# Patient Record
Sex: FEMALE | Race: WHITE | NOT HISPANIC OR LATINO | URBAN - METROPOLITAN AREA
[De-identification: names, ages, dates, MRNs, and addresses within clinical notes are randomized per-mention and may not be internally consistent; named-entity substitution may affect disease eponyms.]

---

## 2017-07-19 ENCOUNTER — IMPORTED ENCOUNTER (OUTPATIENT)
Dept: URBAN - METROPOLITAN AREA CLINIC 50 | Facility: CLINIC | Age: 77
End: 2017-07-19

## 2017-07-24 ENCOUNTER — IMPORTED ENCOUNTER (OUTPATIENT)
Dept: URBAN - METROPOLITAN AREA CLINIC 50 | Facility: CLINIC | Age: 77
End: 2017-07-24

## 2018-07-30 ENCOUNTER — IMPORTED ENCOUNTER (OUTPATIENT)
Dept: URBAN - METROPOLITAN AREA CLINIC 50 | Facility: CLINIC | Age: 78
End: 2018-07-30

## 2019-09-27 ENCOUNTER — IMPORTED ENCOUNTER (OUTPATIENT)
Dept: URBAN - METROPOLITAN AREA CLINIC 50 | Facility: CLINIC | Age: 79
End: 2019-09-27

## 2020-09-08 ENCOUNTER — IMPORTED ENCOUNTER (OUTPATIENT)
Dept: URBAN - METROPOLITAN AREA CLINIC 50 | Facility: CLINIC | Age: 80
End: 2020-09-08

## 2020-09-15 ENCOUNTER — IMPORTED ENCOUNTER (OUTPATIENT)
Dept: URBAN - METROPOLITAN AREA CLINIC 50 | Facility: CLINIC | Age: 80
End: 2020-09-15

## 2020-09-15 PROBLEM — INACTIVE: Noted: 2020-09-15

## 2020-09-15 NOTE — PATIENT DISCUSSION
"""Resolved. "" ""Stop Tobradex Drops left eye four times a day ."" ""Continue Systane Balance both eyes twice a day . """

## 2020-11-06 ENCOUNTER — IMPORTED ENCOUNTER (OUTPATIENT)
Dept: URBAN - METROPOLITAN AREA CLINIC 50 | Facility: CLINIC | Age: 80
End: 2020-11-06

## 2021-04-18 ASSESSMENT — VISUAL ACUITY
OS_SC: 20/25
OD_SC: 20/40-1
OS_SC: 20/30
OD_SC: 20/30
OD_SC: 20/30
OS_CC: J1+@ 16 IN
OD_SC: 20/40
OD_CC: J1+@ 16 IN
OD_PH: 20/25
OD_PH: @ 16 IN
OD_CC: J1+
OS_SC: 20/25
OS_CC: J1+@ 13 IN
OS_PH: @ 16 IN
OD_PH: 20/25
OD_SC: 20/40-
OD_SC: 20/40
OS_SC: 20/40-
OS_PH: 20/25+2
OS_PH: @ 16 IN
OS_SC: 20/30
OD_PH: 20/25
OS_CC: J1+
OS_SC: 20/30+2
OD_PH: @ 16 IN
OD_CC: J1+@ 13 IN
OS_PH: 20/25

## 2021-04-18 ASSESSMENT — TONOMETRY
OD_IOP_MMHG: 15
OS_IOP_MMHG: 15
OD_IOP_MMHG: 15
OD_IOP_MMHG: 16
OD_IOP_MMHG: 15
OS_IOP_MMHG: 17
OS_IOP_MMHG: 16
OS_IOP_MMHG: 16
OD_IOP_MMHG: 14
OS_IOP_MMHG: 18
OD_IOP_MMHG: 15
OS_IOP_MMHG: 16

## 2021-08-31 ENCOUNTER — PROBLEM (OUTPATIENT)
Dept: URBAN - METROPOLITAN AREA CLINIC 53 | Facility: CLINIC | Age: 81
End: 2021-08-31

## 2021-08-31 DIAGNOSIS — H00.14: ICD-10-CM

## 2021-08-31 PROCEDURE — 92012 INTRM OPH EXAM EST PATIENT: CPT

## 2021-08-31 RX ORDER — TOBRAMYCIN AND DEXAMETHASONE 1; 3 MG/ML; MG/ML
1 SUSPENSION/ DROPS OPHTHALMIC
Start: 2021-08-31 | End: 2021-09-07

## 2021-08-31 ASSESSMENT — VISUAL ACUITY
OS_PH: 20/25
OD_PH: 20/25
OS_SC: 20/40
OD_SC: 20/40

## 2021-08-31 ASSESSMENT — TONOMETRY
OS_IOP_MMHG: 14
OD_IOP_MMHG: 14

## 2021-08-31 NOTE — PATIENT DISCUSSION
Start Tobradex OS QID for 1 week then discontinue. Start Warm Compresses.  Chalazion may have caused by Lumify use. Advised to discontinue Lumify.

## 2021-11-12 ENCOUNTER — ANNUAL COMPREHENSIVE EXAM (OUTPATIENT)
Dept: URBAN - METROPOLITAN AREA CLINIC 49 | Facility: CLINIC | Age: 81
End: 2021-11-12

## 2021-11-12 DIAGNOSIS — E11.9: ICD-10-CM

## 2021-11-12 DIAGNOSIS — H02.834: ICD-10-CM

## 2021-11-12 DIAGNOSIS — H43.813: ICD-10-CM

## 2021-11-12 DIAGNOSIS — H02.831: ICD-10-CM

## 2021-11-12 PROCEDURE — 92015 DETERMINE REFRACTIVE STATE: CPT

## 2021-11-12 PROCEDURE — 92014 COMPRE OPH EXAM EST PT 1/>: CPT

## 2021-11-12 ASSESSMENT — VISUAL ACUITY
OS_PH: 20/30
OU_CC: J1+ @ 14IN
OS_CC: J1+ @ 14IN
OD_PH: 20/30
OD_SC: 20/70-1
OD_CC: J2 @ 14IN
OU_SC: 20/40
OS_SC: 20/50-1

## 2021-11-12 ASSESSMENT — TONOMETRY
OD_IOP_MMHG: 16
OS_IOP_MMHG: 16

## 2023-05-18 ENCOUNTER — COMPREHENSIVE EXAM (OUTPATIENT)
Dept: URBAN - METROPOLITAN AREA CLINIC 49 | Facility: CLINIC | Age: 83
End: 2023-05-18

## 2023-05-18 DIAGNOSIS — G43.B0: ICD-10-CM

## 2023-05-18 DIAGNOSIS — E11.9: ICD-10-CM

## 2023-05-18 DIAGNOSIS — H43.813: ICD-10-CM

## 2023-05-18 DIAGNOSIS — H02.834: ICD-10-CM

## 2023-05-18 DIAGNOSIS — H02.831: ICD-10-CM

## 2023-05-18 DIAGNOSIS — H47.321: ICD-10-CM

## 2023-05-18 PROCEDURE — 92014 COMPRE OPH EXAM EST PT 1/>: CPT

## 2023-05-18 ASSESSMENT — KERATOMETRY
OD_K1POWER_DIOPTERS: 44.75
OS_K2POWER_DIOPTERS: 46.50
OS_AXISANGLE2_DEGREES: 177
OS_K1POWER_DIOPTERS: 44.75
OD_AXISANGLE2_DEGREES: 1
OS_AXISANGLE_DEGREES: 87
OD_K2POWER_DIOPTERS: 46.25
OD_AXISANGLE_DEGREES: 91

## 2023-05-18 ASSESSMENT — VISUAL ACUITY
OS_CC: J1+@16"
OU_CC: J1+@16"
OU_CC: 20/20
OD_CC: J1+@16"
OS_GLARE: 20/20
OD_CC: 20/25+2
OS_CC: 20/20
OS_GLARE: 20/25

## 2023-05-18 ASSESSMENT — TONOMETRY
OS_IOP_MMHG: 16
OD_IOP_MMHG: 16

## 2024-09-11 ENCOUNTER — COMPREHENSIVE EXAM (OUTPATIENT)
Dept: URBAN - METROPOLITAN AREA CLINIC 49 | Facility: LOCATION | Age: 84
End: 2024-09-11

## 2024-09-11 DIAGNOSIS — H02.834: ICD-10-CM

## 2024-09-11 DIAGNOSIS — H43.813: ICD-10-CM

## 2024-09-11 DIAGNOSIS — H02.831: ICD-10-CM

## 2024-09-11 DIAGNOSIS — E11.9: ICD-10-CM

## 2024-09-11 PROCEDURE — 99214 OFFICE O/P EST MOD 30 MIN: CPT
